# Patient Record
Sex: FEMALE | Race: OTHER | NOT HISPANIC OR LATINO | URBAN - METROPOLITAN AREA
[De-identification: names, ages, dates, MRNs, and addresses within clinical notes are randomized per-mention and may not be internally consistent; named-entity substitution may affect disease eponyms.]

---

## 2017-06-28 ENCOUNTER — OUTPATIENT (OUTPATIENT)
Dept: OUTPATIENT SERVICES | Facility: HOSPITAL | Age: 72
LOS: 1 days | End: 2017-06-28
Payer: COMMERCIAL

## 2017-06-28 DIAGNOSIS — Z22.321 CARRIER OR SUSPECTED CARRIER OF METHICILLIN SUSCEPTIBLE STAPHYLOCOCCUS AUREUS: ICD-10-CM

## 2017-06-28 PROCEDURE — 87641 MR-STAPH DNA AMP PROBE: CPT

## 2017-06-30 LAB
MRSA PCR RESULT.: NEGATIVE — SIGNIFICANT CHANGE UP
S AUREUS DNA NOSE QL NAA+PROBE: NEGATIVE — SIGNIFICANT CHANGE UP

## 2017-07-13 VITALS
DIASTOLIC BLOOD PRESSURE: 62 MMHG | SYSTOLIC BLOOD PRESSURE: 139 MMHG | RESPIRATION RATE: 16 BRPM | OXYGEN SATURATION: 96 % | WEIGHT: 144.18 LBS | HEART RATE: 66 BPM | HEIGHT: 65 IN | TEMPERATURE: 97 F

## 2017-07-13 RX ORDER — CELECOXIB 200 MG/1
400 CAPSULE ORAL ONCE
Qty: 0 | Refills: 0 | Status: COMPLETED | OUTPATIENT
Start: 2017-07-14 | End: 2017-07-14

## 2017-07-13 RX ORDER — OXYCODONE HYDROCHLORIDE 5 MG/1
20 TABLET ORAL ONCE
Qty: 0 | Refills: 0 | Status: DISCONTINUED | OUTPATIENT
Start: 2017-07-14 | End: 2017-07-14

## 2017-07-13 NOTE — H&P ADULT - PROBLEM SELECTOR PLAN 1
Admit for Left TKA. Medically cleared by Dr. Garnett Admit for Left TKA.   Medically cleared by Dr. Garnett

## 2017-07-13 NOTE — H&P ADULT - NSHPPHYSICALEXAM_GEN_ALL_CORE
MSK:    Right knee pain limited secondary to pain          Rest of PE per medical clearance note Gen: Pt seated in chair in NAD   Skin: Warm, color normal, no wounds, rashes.   Extremities: +Mild chronic varicosities to bilateral LE. No clubbing, no cyanosis, no edema.  Musculoskeletal: Decreased ROM secondary to pain, left knee. Sensation intact to light touch to bilateral LE distally. Motor Strength 5/5 to TA/GS/EHL/FHL bilaterally.  Vascular: DP/PT 2+, Brisk cap refill, Toes wwp     Remainder of exam as per medical clearance note

## 2017-07-13 NOTE — H&P ADULT - PSH
S/P biopsy  breast  Surgery, elective  Pressure release from eyes for glaucoma  Surgery, elective  ankle surgery, 1997

## 2017-07-13 NOTE — H&P ADULT - HISTORY OF PRESENT ILLNESS
71 y.o. F with left knee pain x    Presents for elective Left total knee replacement surgery 71 y.o. F with left knee pain x 10+ years that began spontaneously and without accident or trauma. Pt pain has progressively worsened without improvement and has become increasingly unresponsive to conservative management. Pt also c/o intermittent swelling requiring therapeutic arthrocentesis previously. Pt has also undergone Physical therapy and injections with temporary but non-lasting relief. Pt ambulates without assistance at home. Denies numbness/tingling of extremities, F/C/N/V, CP, SOB today.     Presents for elective Left total knee replacement surgery

## 2017-07-13 NOTE — H&P ADULT - PMH
Diverticulitis    Fall    GERD (gastroesophageal reflux disease)    Glaucoma    Graves' disease    OA (osteoarthritis)

## 2017-07-13 NOTE — PATIENT PROFILE ADULT. - PMH
Diverticulitis    GERD (gastroesophageal reflux disease)    Graves' disease    OA (osteoarthritis) Diverticulitis    Fall    GERD (gastroesophageal reflux disease)    Glaucoma    Graves' disease    OA (osteoarthritis)

## 2017-07-13 NOTE — H&P ADULT - FAMILY HISTORY
Father  Still living? Unknown  Family history of coronary artery disease, Age at diagnosis: Age Unknown  Family history of cerebrovascular accident (CVA), Age at diagnosis: Age Unknown  Family history of diabetes mellitus (DM), Age at diagnosis: Age Unknown     Mother  Still living? Unknown  Family history of coronary artery disease, Age at diagnosis: Age Unknown  Family history of diabetes mellitus (DM), Age at diagnosis: Age Unknown

## 2017-07-13 NOTE — PATIENT PROFILE ADULT. - PSH
Glaucoma    S/P biopsy  breast  Surgery, elective  ankle surgery, 1997 S/P biopsy  breast  Surgery, elective  Pressure release from eyes for glaucoma  Surgery, elective  ankle surgery, 1997

## 2017-07-14 ENCOUNTER — INPATIENT (INPATIENT)
Facility: HOSPITAL | Age: 72
LOS: 1 days | Discharge: HOME HEALTH | DRG: 470 | End: 2017-07-16
Attending: SPECIALIST | Admitting: SPECIALIST
Payer: COMMERCIAL

## 2017-07-14 DIAGNOSIS — Z41.9 ENCOUNTER FOR PROCEDURE FOR PURPOSES OTHER THAN REMEDYING HEALTH STATE, UNSPECIFIED: Chronic | ICD-10-CM

## 2017-07-14 DIAGNOSIS — M17.12 UNILATERAL PRIMARY OSTEOARTHRITIS, LEFT KNEE: ICD-10-CM

## 2017-07-14 DIAGNOSIS — Z98.890 OTHER SPECIFIED POSTPROCEDURAL STATES: Chronic | ICD-10-CM

## 2017-07-14 LAB
APPEARANCE UR: CLEAR — SIGNIFICANT CHANGE UP
BACTERIA # UR AUTO: PRESENT /HPF
BILIRUB UR-MCNC: NEGATIVE — SIGNIFICANT CHANGE UP
COLOR SPEC: YELLOW — SIGNIFICANT CHANGE UP
COMMENT - URINE: SIGNIFICANT CHANGE UP
DIFF PNL FLD: (no result)
EPI CELLS # UR: (no result) /HPF
GLUCOSE UR QL: NEGATIVE — SIGNIFICANT CHANGE UP
KETONES UR-MCNC: NEGATIVE — SIGNIFICANT CHANGE UP
LEUKOCYTE ESTERASE UR-ACNC: (no result)
NITRITE UR-MCNC: NEGATIVE — SIGNIFICANT CHANGE UP
PH UR: 5.5 — SIGNIFICANT CHANGE UP (ref 5–8)
PROT UR-MCNC: NEGATIVE MG/DL — SIGNIFICANT CHANGE UP
RBC CASTS # UR COMP ASSIST: < 5 /HPF — SIGNIFICANT CHANGE UP
SP GR SPEC: 1.01 — SIGNIFICANT CHANGE UP (ref 1–1.03)
UROBILINOGEN FLD QL: 0.2 E.U./DL — SIGNIFICANT CHANGE UP
WBC UR QL: (no result) /HPF

## 2017-07-14 PROCEDURE — 73560 X-RAY EXAM OF KNEE 1 OR 2: CPT | Mod: 26,LT

## 2017-07-14 RX ORDER — LACTOBACILLUS ACIDOPHILUS 100MM CELL
2.5 CAPSULE ORAL
Qty: 0 | Refills: 0 | COMMUNITY

## 2017-07-14 RX ORDER — OXYCODONE HYDROCHLORIDE 5 MG/1
5 TABLET ORAL EVERY 4 HOURS
Qty: 0 | Refills: 0 | Status: DISCONTINUED | OUTPATIENT
Start: 2017-07-14 | End: 2017-07-16

## 2017-07-14 RX ORDER — SODIUM CHLORIDE 9 MG/ML
1000 INJECTION INTRAMUSCULAR; INTRAVENOUS; SUBCUTANEOUS
Qty: 0 | Refills: 0 | Status: DISCONTINUED | OUTPATIENT
Start: 2017-07-14 | End: 2017-07-16

## 2017-07-14 RX ORDER — ASPIRIN/CALCIUM CARB/MAGNESIUM 324 MG
325 TABLET ORAL
Qty: 0 | Refills: 0 | Status: DISCONTINUED | OUTPATIENT
Start: 2017-07-14 | End: 2017-07-16

## 2017-07-14 RX ORDER — POLYETHYLENE GLYCOL 3350 17 G/17G
17 POWDER, FOR SOLUTION ORAL DAILY
Qty: 0 | Refills: 0 | Status: DISCONTINUED | OUTPATIENT
Start: 2017-07-14 | End: 2017-07-16

## 2017-07-14 RX ORDER — CELECOXIB 200 MG/1
200 CAPSULE ORAL DAILY
Qty: 0 | Refills: 0 | Status: DISCONTINUED | OUTPATIENT
Start: 2017-07-15 | End: 2017-07-16

## 2017-07-14 RX ORDER — MORPHINE SULFATE 50 MG/1
4 CAPSULE, EXTENDED RELEASE ORAL EVERY 4 HOURS
Qty: 0 | Refills: 0 | Status: DISCONTINUED | OUTPATIENT
Start: 2017-07-14 | End: 2017-07-16

## 2017-07-14 RX ORDER — PANTOPRAZOLE SODIUM 20 MG/1
40 TABLET, DELAYED RELEASE ORAL DAILY
Qty: 0 | Refills: 0 | Status: DISCONTINUED | OUTPATIENT
Start: 2017-07-14 | End: 2017-07-16

## 2017-07-14 RX ORDER — ACETAMINOPHEN 500 MG
650 TABLET ORAL EVERY 6 HOURS
Qty: 0 | Refills: 0 | Status: DISCONTINUED | OUTPATIENT
Start: 2017-07-14 | End: 2017-07-16

## 2017-07-14 RX ORDER — DOCUSATE SODIUM 100 MG
100 CAPSULE ORAL THREE TIMES A DAY
Qty: 0 | Refills: 0 | Status: DISCONTINUED | OUTPATIENT
Start: 2017-07-14 | End: 2017-07-16

## 2017-07-14 RX ORDER — KETOROLAC TROMETHAMINE 30 MG/ML
15 SYRINGE (ML) INJECTION ONCE
Qty: 0 | Refills: 0 | Status: DISCONTINUED | OUTPATIENT
Start: 2017-07-14 | End: 2017-07-14

## 2017-07-14 RX ORDER — BUPIVACAINE 13.3 MG/ML
20 INJECTION, SUSPENSION, LIPOSOMAL INFILTRATION ONCE
Qty: 0 | Refills: 0 | Status: DISCONTINUED | OUTPATIENT
Start: 2017-07-14 | End: 2017-07-16

## 2017-07-14 RX ORDER — MAGNESIUM HYDROXIDE 400 MG/1
30 TABLET, CHEWABLE ORAL DAILY
Qty: 0 | Refills: 0 | Status: DISCONTINUED | OUTPATIENT
Start: 2017-07-14 | End: 2017-07-16

## 2017-07-14 RX ORDER — SENNA PLUS 8.6 MG/1
2 TABLET ORAL AT BEDTIME
Qty: 0 | Refills: 0 | Status: DISCONTINUED | OUTPATIENT
Start: 2017-07-14 | End: 2017-07-16

## 2017-07-14 RX ORDER — ONDANSETRON 8 MG/1
4 TABLET, FILM COATED ORAL EVERY 6 HOURS
Qty: 0 | Refills: 0 | Status: DISCONTINUED | OUTPATIENT
Start: 2017-07-14 | End: 2017-07-16

## 2017-07-14 RX ORDER — MORPHINE SULFATE 50 MG/1
4 CAPSULE, EXTENDED RELEASE ORAL
Qty: 0 | Refills: 0 | Status: DISCONTINUED | OUTPATIENT
Start: 2017-07-14 | End: 2017-07-14

## 2017-07-14 RX ORDER — OXYCODONE HYDROCHLORIDE 5 MG/1
10 TABLET ORAL EVERY 12 HOURS
Qty: 0 | Refills: 0 | Status: DISCONTINUED | OUTPATIENT
Start: 2017-07-14 | End: 2017-07-16

## 2017-07-14 RX ORDER — CEFAZOLIN SODIUM 1 G
2000 VIAL (EA) INJECTION EVERY 8 HOURS
Qty: 0 | Refills: 0 | Status: COMPLETED | OUTPATIENT
Start: 2017-07-14 | End: 2017-07-15

## 2017-07-14 RX ADMIN — MORPHINE SULFATE 4 MILLIGRAM(S): 50 CAPSULE, EXTENDED RELEASE ORAL at 12:16

## 2017-07-14 RX ADMIN — OXYCODONE HYDROCHLORIDE 10 MILLIGRAM(S): 5 TABLET ORAL at 17:26

## 2017-07-14 RX ADMIN — OXYCODONE HYDROCHLORIDE 5 MILLIGRAM(S): 5 TABLET ORAL at 21:42

## 2017-07-14 RX ADMIN — Medication 15 MILLIGRAM(S): at 14:56

## 2017-07-14 RX ADMIN — OXYCODONE HYDROCHLORIDE 20 MILLIGRAM(S): 5 TABLET ORAL at 07:36

## 2017-07-14 RX ADMIN — Medication 100 MILLIGRAM(S): at 17:28

## 2017-07-14 RX ADMIN — MORPHINE SULFATE 4 MILLIGRAM(S): 50 CAPSULE, EXTENDED RELEASE ORAL at 12:00

## 2017-07-14 RX ADMIN — Medication 325 MILLIGRAM(S): at 17:25

## 2017-07-14 RX ADMIN — OXYCODONE HYDROCHLORIDE 5 MILLIGRAM(S): 5 TABLET ORAL at 22:05

## 2017-07-14 RX ADMIN — OXYCODONE HYDROCHLORIDE 10 MILLIGRAM(S): 5 TABLET ORAL at 18:20

## 2017-07-14 RX ADMIN — OXYCODONE HYDROCHLORIDE 5 MILLIGRAM(S): 5 TABLET ORAL at 12:38

## 2017-07-14 RX ADMIN — Medication 100 MILLIGRAM(S): at 21:42

## 2017-07-14 RX ADMIN — CELECOXIB 400 MILLIGRAM(S): 200 CAPSULE ORAL at 07:35

## 2017-07-14 RX ADMIN — MORPHINE SULFATE 4 MILLIGRAM(S): 50 CAPSULE, EXTENDED RELEASE ORAL at 12:36

## 2017-07-14 RX ADMIN — MORPHINE SULFATE 4 MILLIGRAM(S): 50 CAPSULE, EXTENDED RELEASE ORAL at 12:15

## 2017-07-14 RX ADMIN — MORPHINE SULFATE 4 MILLIGRAM(S): 50 CAPSULE, EXTENDED RELEASE ORAL at 12:35

## 2017-07-14 NOTE — PHYSICAL THERAPY INITIAL EVALUATION ADULT - ADDITIONAL COMMENTS
Patient lives with son in an apartment, elevator access. Patient reports stairs outside the building and in the lobby, however there are also ramps she can access. Per patient, son works night shift at his job, will be home during the day to assist patient as needed. Patient reports independence with all functional mobility without the use of an assistive device prior to hospitalization.

## 2017-07-14 NOTE — PHYSICAL THERAPY INITIAL EVALUATION ADULT - CRITERIA FOR SKILLED THERAPEUTIC INTERVENTIONS
risk reduction/prevention/impairments found/functional limitations in following categories/predicted duration of therapy intervention/anticipated discharge recommendation/rehab potential/therapy frequency/anticipated equipment needs at discharge

## 2017-07-14 NOTE — PHYSICAL THERAPY INITIAL EVALUATION ADULT - GAIT DEVIATIONS NOTED, PT EVAL
decreased velocity of limb motion/decreased step length/decreased weight-shifting ability/decreased nu

## 2017-07-14 NOTE — PROGRESS NOTE ADULT - SUBJECTIVE AND OBJECTIVE BOX
Orthopaedics Post Op Check    Procedure: Left total knee replacement   Surgeon: Dr. Fagan     Pt comfortable, without complaints  Denies CP, SOB, N/V, numbness/tingling     Vital Signs Last 24 Hrs  T(C): 35.9 (14 Jul 2017 14:34), Max: 36.7 (14 Jul 2017 11:05)  T(F): 96.7 (14 Jul 2017 14:34), Max: 98 (14 Jul 2017 11:05)  HR: 78 (14 Jul 2017 14:34) (52 - 78)  BP: 137/68 (14 Jul 2017 14:34) (105/62 - 137/68)  BP(mean): --  RR: 15 (14 Jul 2017 14:34) (10 - 20)  SpO2: 98% (14 Jul 2017 14:34) (98% - 99%)  AVSS, NAD    Dressing C/D/I  General: Pt Alert and oriented     Pulses: DP pulses palpable bilaterally  Sensation: SLT in tact and equal to distal bilateral lower extremities   Motor: EHL/FHL/TA/GS 5/5 motor strength bilateral lower extremities    Post op XR: left knee prosthesis in place    A/P: 71yFemale POD#0 s/p left TKR  - Stable  - Pain Control  - DVT ppx:  bid  - Post op abx: Ancef  - PT, WBS:  WBAT  - F/U AM Labs

## 2017-07-14 NOTE — PHYSICAL THERAPY INITIAL EVALUATION ADULT - GAIT DISTANCE, PT EVAL
2 side steps to right towards head of bed, 1 step backward. Ambulation limited secondary to complaints of increased dizziness as well as increased drowsiness throughout session

## 2017-07-14 NOTE — PHYSICAL THERAPY INITIAL EVALUATION ADULT - ASR EQUIP NEEDS DISCH PT EVAL
standard cane/Equipment to be ordered prior to time of discharge. Patient also inquiring about commode/rolling walker (5 inch wheels)

## 2017-07-14 NOTE — PHYSICAL THERAPY INITIAL EVALUATION ADULT - LIVES WITH, PROFILE
27 year old son. Per patient, son works night shift at his job and will be home during the day to assist her as needed/children

## 2017-07-14 NOTE — PHYSICAL THERAPY INITIAL EVALUATION ADULT - ACTIVE RANGE OF MOTION EXAMINATION, REHAB EVAL
Right LE Active ROM was WFL (within functional limits)/left knee flexion AROM 2-40/Left UE Active ROM was WFL (within functional limits)/Right UE Active ROM was WFL (within functional limits)

## 2017-07-15 LAB
ANION GAP SERPL CALC-SCNC: 10 MMOL/L — SIGNIFICANT CHANGE UP (ref 5–17)
BUN SERPL-MCNC: 9 MG/DL — SIGNIFICANT CHANGE UP (ref 7–23)
CALCIUM SERPL-MCNC: 8.6 MG/DL — SIGNIFICANT CHANGE UP (ref 8.4–10.5)
CHLORIDE SERPL-SCNC: 101 MMOL/L — SIGNIFICANT CHANGE UP (ref 96–108)
CO2 SERPL-SCNC: 26 MMOL/L — SIGNIFICANT CHANGE UP (ref 22–31)
CREAT SERPL-MCNC: 0.6 MG/DL — SIGNIFICANT CHANGE UP (ref 0.5–1.3)
CULTURE RESULTS: NO GROWTH — SIGNIFICANT CHANGE UP
GLUCOSE SERPL-MCNC: 118 MG/DL — HIGH (ref 70–99)
HCT VFR BLD CALC: 36.3 % — SIGNIFICANT CHANGE UP (ref 34.5–45)
HGB BLD-MCNC: 12.1 G/DL — SIGNIFICANT CHANGE UP (ref 11.5–15.5)
MCHC RBC-ENTMCNC: 29.2 PG — SIGNIFICANT CHANGE UP (ref 27–34)
MCHC RBC-ENTMCNC: 33.3 G/DL — SIGNIFICANT CHANGE UP (ref 32–36)
MCV RBC AUTO: 87.7 FL — SIGNIFICANT CHANGE UP (ref 80–100)
PLATELET # BLD AUTO: 183 K/UL — SIGNIFICANT CHANGE UP (ref 150–400)
POTASSIUM SERPL-MCNC: 4 MMOL/L — SIGNIFICANT CHANGE UP (ref 3.5–5.3)
POTASSIUM SERPL-SCNC: 4 MMOL/L — SIGNIFICANT CHANGE UP (ref 3.5–5.3)
RBC # BLD: 4.14 M/UL — SIGNIFICANT CHANGE UP (ref 3.8–5.2)
RBC # FLD: 13.4 % — SIGNIFICANT CHANGE UP (ref 10.3–16.9)
SODIUM SERPL-SCNC: 137 MMOL/L — SIGNIFICANT CHANGE UP (ref 135–145)
SPECIMEN SOURCE: SIGNIFICANT CHANGE UP
WBC # BLD: 9 K/UL — SIGNIFICANT CHANGE UP (ref 3.8–10.5)
WBC # FLD AUTO: 9 K/UL — SIGNIFICANT CHANGE UP (ref 3.8–10.5)

## 2017-07-15 RX ADMIN — OXYCODONE HYDROCHLORIDE 5 MILLIGRAM(S): 5 TABLET ORAL at 21:50

## 2017-07-15 RX ADMIN — Medication 100 MILLIGRAM(S): at 21:20

## 2017-07-15 RX ADMIN — OXYCODONE HYDROCHLORIDE 10 MILLIGRAM(S): 5 TABLET ORAL at 06:00

## 2017-07-15 RX ADMIN — POLYETHYLENE GLYCOL 3350 17 GRAM(S): 17 POWDER, FOR SOLUTION ORAL at 08:59

## 2017-07-15 RX ADMIN — OXYCODONE HYDROCHLORIDE 5 MILLIGRAM(S): 5 TABLET ORAL at 08:58

## 2017-07-15 RX ADMIN — Medication 650 MILLIGRAM(S): at 04:50

## 2017-07-15 RX ADMIN — OXYCODONE HYDROCHLORIDE 5 MILLIGRAM(S): 5 TABLET ORAL at 14:41

## 2017-07-15 RX ADMIN — Medication 325 MILLIGRAM(S): at 05:27

## 2017-07-15 RX ADMIN — OXYCODONE HYDROCHLORIDE 10 MILLIGRAM(S): 5 TABLET ORAL at 18:30

## 2017-07-15 RX ADMIN — OXYCODONE HYDROCHLORIDE 10 MILLIGRAM(S): 5 TABLET ORAL at 05:27

## 2017-07-15 RX ADMIN — PANTOPRAZOLE SODIUM 40 MILLIGRAM(S): 20 TABLET, DELAYED RELEASE ORAL at 08:58

## 2017-07-15 RX ADMIN — Medication 325 MILLIGRAM(S): at 17:35

## 2017-07-15 RX ADMIN — Medication 100 MILLIGRAM(S): at 00:52

## 2017-07-15 RX ADMIN — OXYCODONE HYDROCHLORIDE 5 MILLIGRAM(S): 5 TABLET ORAL at 13:51

## 2017-07-15 RX ADMIN — CELECOXIB 200 MILLIGRAM(S): 200 CAPSULE ORAL at 08:58

## 2017-07-15 RX ADMIN — Medication 100 MILLIGRAM(S): at 05:27

## 2017-07-15 RX ADMIN — OXYCODONE HYDROCHLORIDE 5 MILLIGRAM(S): 5 TABLET ORAL at 21:20

## 2017-07-15 RX ADMIN — OXYCODONE HYDROCHLORIDE 5 MILLIGRAM(S): 5 TABLET ORAL at 01:47

## 2017-07-15 RX ADMIN — OXYCODONE HYDROCHLORIDE 10 MILLIGRAM(S): 5 TABLET ORAL at 17:35

## 2017-07-15 RX ADMIN — OXYCODONE HYDROCHLORIDE 5 MILLIGRAM(S): 5 TABLET ORAL at 09:30

## 2017-07-15 RX ADMIN — Medication 100 MILLIGRAM(S): at 13:51

## 2017-07-15 NOTE — PROGRESS NOTE ADULT - SUBJECTIVE AND OBJECTIVE BOX
SUBJECTIVE: Patient seen and examined. Pt doing well o/n.  No f/c/n/v/cp/sob.     OBJECTIVE:  Vital Signs Last 24 Hrs  T(C): 36.2 (15 Jul 2017 05:05), Max: 36.7 (2017 11:05)  T(F): 97.2 (15 Jul 2017 05:05), Max: 98 (2017 11:05)  HR: 68 (15 Jul 2017 05:05) (52 - 78)  BP: 134/75 (15 Jul 2017 05:05) (105/62 - 137/68)  BP(mean): --  RR: 16 (15 Jul 2017 05:05) (10 - 20)  SpO2: 96% (15 Jul 2017 05:05) (96% - 99%)    Affected extremity:          Dressing: clean/dry/intact            Sensation: SILT         Motor exam: 5/5 TA/GS/EHL         warm well perfused; capillary refill <3 seconds     LABS:            Urinalysis Basic - ( 2017 07:25 )    Color: Yellow / Appearance: Clear / S.010 / pH: x  Gluc: x / Ketone: NEGATIVE  / Bili: NEGATIVE / Urobili: 0.2 E.U./dL   Blood: x / Protein: NEGATIVE mg/dL / Nitrite: NEGATIVE   Leuk Esterase: Large / RBC: < 5 /HPF / WBC Many /HPF   Sq Epi: x / Non Sq Epi: Moderate /HPF / Bacteria: Present /HPF      MEDICATIONS:BUpivacaine liposome 1.3% Injectable (no eMAR) 20 milliLiter(s) Local Injection once  sodium chloride 0.9%. 1000 milliLiter(s) IV Continuous <Continuous>  acetaminophen   Tablet 650 milliGRAM(s) Oral every 6 hours PRN  oxyCODONE    IR 5 milliGRAM(s) Oral every 4 hours PRN  morphine  - Injectable 4 milliGRAM(s) IV Push every 4 hours PRN  aluminum hydroxide/magnesium hydroxide/simethicone Suspension 30 milliLiter(s) Oral four times a day PRN  ondansetron Injectable 4 milliGRAM(s) IV Push every 6 hours PRN  pantoprazole    Tablet 40 milliGRAM(s) Oral daily  magnesium hydroxide Suspension 30 milliLiter(s) Oral daily PRN  polyethylene glycol 3350 17 Gram(s) Oral daily  senna 2 Tablet(s) Oral at bedtime PRN  docusate sodium 100 milliGRAM(s) Oral three times a day  oxyCODONE  ER Tablet 10 milliGRAM(s) Oral every 12 hours  celecoxib 200 milliGRAM(s) Oral daily  aspirin enteric coated 325 milliGRAM(s) Oral two times a day    Anticoagulation:  aspirin enteric coated 325 milliGRAM(s) Oral two times a day    Antibiotics:     Pain medications:   acetaminophen   Tablet 650 milliGRAM(s) Oral every 6 hours PRN  oxyCODONE    IR 5 milliGRAM(s) Oral every 4 hours PRN  morphine  - Injectable 4 milliGRAM(s) IV Push every 4 hours PRN  ondansetron Injectable 4 milliGRAM(s) IV Push every 6 hours PRN  oxyCODONE  ER Tablet 10 milliGRAM(s) Oral every 12 hours  celecoxib 200 milliGRAM(s) Oral daily    A/P :  Pt is a 70yo Female s/p L TKA 17  POD # 1  -    Pain control  -    DVT ppx: ASA     -    Weight bearing status: WBAT   -    Physical Therapy  -    Dispo: Home

## 2017-07-16 VITALS
RESPIRATION RATE: 16 BRPM | OXYGEN SATURATION: 95 % | TEMPERATURE: 99 F | SYSTOLIC BLOOD PRESSURE: 124 MMHG | HEART RATE: 78 BPM | DIASTOLIC BLOOD PRESSURE: 59 MMHG

## 2017-07-16 RX ORDER — ASPIRIN/CALCIUM CARB/MAGNESIUM 324 MG
1 TABLET ORAL
Qty: 60 | Refills: 0 | OUTPATIENT
Start: 2017-07-16

## 2017-07-16 RX ORDER — OXYCODONE HYDROCHLORIDE 5 MG/1
1 TABLET ORAL
Qty: 40 | Refills: 0 | OUTPATIENT
Start: 2017-07-16

## 2017-07-16 RX ADMIN — Medication 325 MILLIGRAM(S): at 05:20

## 2017-07-16 RX ADMIN — Medication 100 MILLIGRAM(S): at 05:20

## 2017-07-16 RX ADMIN — PANTOPRAZOLE SODIUM 40 MILLIGRAM(S): 20 TABLET, DELAYED RELEASE ORAL at 12:05

## 2017-07-16 RX ADMIN — CELECOXIB 200 MILLIGRAM(S): 200 CAPSULE ORAL at 12:04

## 2017-07-16 RX ADMIN — OXYCODONE HYDROCHLORIDE 10 MILLIGRAM(S): 5 TABLET ORAL at 05:50

## 2017-07-16 RX ADMIN — Medication 100 MILLIGRAM(S): at 12:04

## 2017-07-16 RX ADMIN — OXYCODONE HYDROCHLORIDE 10 MILLIGRAM(S): 5 TABLET ORAL at 05:20

## 2017-07-16 RX ADMIN — OXYCODONE HYDROCHLORIDE 5 MILLIGRAM(S): 5 TABLET ORAL at 10:32

## 2017-07-16 RX ADMIN — POLYETHYLENE GLYCOL 3350 17 GRAM(S): 17 POWDER, FOR SOLUTION ORAL at 12:04

## 2017-07-16 RX ADMIN — OXYCODONE HYDROCHLORIDE 5 MILLIGRAM(S): 5 TABLET ORAL at 11:32

## 2017-07-16 RX ADMIN — CELECOXIB 200 MILLIGRAM(S): 200 CAPSULE ORAL at 13:04

## 2017-07-16 NOTE — DISCHARGE NOTE ADULT - PLAN OF CARE
Improvement in knee function Patient was admitted for total joint replacement. Patient received gisel-op antibiotics. Patient tolerated the surgery well, and post operatively she was transferred to the surgical floor. Patient stayed on the surgical floor and received DVT prophylaxis. Patient tolerated the diet well. Pain was controlled with PO pain meds. Patient was able to get out of the bed and ambulate with support. He was discharged home on PO pain medication after clearance by PT. Plan to return 2 weeks after your joint replacement for follow-up. Appointments: by calling office number.  Place a dry dressing on the knee incision once or twice a day. The drainage from the knee should be decreasing. If it is not decreasing, let us know. Do not put any creams or ointments on the incision for 6 weeks after surgery.  A physical therapist should see you at least two to three times per week to move the knee and keep it limber. If for some reason the physical therapy is not being done, call us so that we can help you schedule it.  Do not sleep or rest with a pillow under your knee. Doing so would cause the knee to get stuck in a bent position. Instead, place the pillow under your ankle so that it stretches out the back of the knee and forces the knee to straighten.  You may shower starting 4 days after surgery. The incision can get wet but should be patted dry at the end of the shower. Do not soak the knee in a bathtub, hot tub, lake, or pool for 6 weeks after surgery. Showers only until that time.  you should not drive for 6 weeks after joint replacement never drive while on narcotic medications.  Try to take the narcotic medications as sparingly as possible: before going to bed and before physical therapy are two common times when patients feel they have more pain and should take the narcotics. Other pain relievers, such as ibuprofen and acetaminophen, are almost as effective without the severe side effects.  Most patients will take 325 mg of aspirin twice a day and wear compression stockings to prevent blood clots. Coated aspirin (Ecotrin or Bufferin) is easier to take than uncoated aspirin, especially when taken with food.  If you have chest pain or shortness of breath, call 911 to be seen by the EMS.  If you have any of the following, call our office to be seen in the clinic:  Increasing redness or increasing drainage around the incision  Increasing pain despite taking pain medication  Temperature of 101.5 degrees Fahrenheit or above

## 2017-07-16 NOTE — DISCHARGE NOTE ADULT - CARE PROVIDER_API CALL
Frank Fagan), Orthopaedic Surgery  [address] [place] [address]  Phone: [phone]  Fax: [phone]

## 2017-07-16 NOTE — DISCHARGE NOTE ADULT - CARE PLAN
Principal Discharge DX:	Primary osteoarthritis of left knee  Goal:	Improvement in knee function  Instructions for follow-up, activity and diet:	Patient was admitted for total joint replacement. Patient received gisel-op antibiotics. Patient tolerated the surgery well, and post operatively she was transferred to the surgical floor. Patient stayed on the surgical floor and received DVT prophylaxis. Patient tolerated the diet well. Pain was controlled with PO pain meds. Patient was able to get out of the bed and ambulate with support. He was discharged home on PO pain medication after clearance by PT. Principal Discharge DX:	Primary osteoarthritis of left knee  Goal:	Improvement in knee function  Instructions for follow-up, activity and diet:	Plan to return 2 weeks after your joint replacement for follow-up. Appointments: by calling office number.  Place a dry dressing on the knee incision once or twice a day. The drainage from the knee should be decreasing. If it is not decreasing, let us know. Do not put any creams or ointments on the incision for 6 weeks after surgery.  A physical therapist should see you at least two to three times per week to move the knee and keep it limber. If for some reason the physical therapy is not being done, call us so that we can help you schedule it.  Do not sleep or rest with a pillow under your knee. Doing so would cause the knee to get stuck in a bent position. Instead, place the pillow under your ankle so that it stretches out the back of the knee and forces the knee to straighten.  You may shower starting 4 days after surgery. The incision can get wet but should be patted dry at the end of the shower. Do not soak the knee in a bathtub, hot tub, lake, or pool for 6 weeks after surgery. Showers only until that time.  you should not drive for 6 weeks after joint replacement never drive while on narcotic medications.  Try to take the narcotic medications as sparingly as possible: before going to bed and before physical therapy are two common times when patients feel they have more pain and should take the narcotics. Other pain relievers, such as ibuprofen and acetaminophen, are almost as effective without the severe side effects.  Most patients will take 325 mg of aspirin twice a day and wear compression stockings to prevent blood clots. Coated aspirin (Ecotrin or Bufferin) is easier to take than uncoated aspirin, especially when taken with food.  If you have chest pain or shortness of breath, call 911 to be seen by the EMS.  If you have any of the following, call our office to be seen in the clinic:  Increasing redness or increasing drainage around the incision  Increasing pain despite taking pain medication  Temperature of 101.5 degrees Fahrenheit or above

## 2017-07-16 NOTE — DISCHARGE NOTE ADULT - MEDICATION SUMMARY - MEDICATIONS TO TAKE
I will START or STAY ON the medications listed below when I get home from the hospital:    magnesium  -- 250 milligram(s) by mouth once a day  -- Indication: For Home    aspirin 325 mg oral delayed release tablet  -- 1 tab(s) by mouth 2 times a day  -- Indication: For Anti Coag    oxyCODONE 5 mg oral tablet  -- 1 tab(s) by mouth every 4 hours, As Needed -for moderate pain MDD:12  -- Caution federal law prohibits the transfer of this drug to any person other  than the person for whom it was prescribed.  It is very important that you take or use this exactly as directed.  Do not skip doses or discontinue unless directed by your doctor.  May cause drowsiness.  Alcohol may intensify this effect.  Use care when operating dangerous machinery.  This prescription cannot be refilled.  Using more of this medication than prescribed may cause serious breathing problems.    -- Indication: For Pain    copper gluconate 2 mg oral tablet  -- 1 tab(s) by mouth once a day  -- Indication: For Home    lactobacillus acidophilus  -- 2.5  billion CFU by mouth   -- Indication: For Home    Calcium 600+D oral tablet  --  by mouth , 600mg + 400 IU  -- Indication: For Home

## 2017-07-16 NOTE — DISCHARGE NOTE ADULT - HOSPITAL COURSE
Patient was admitted for total joint replacement. Patient received gisel-op antibiotics. Patient tolerated the surgery well, and post operatively she was transferred to the surgical floor. Patient stayed on the surgical floor and received DVT prophylaxis. Patient tolerated the diet well. Pain was controlled with PO pain meds. Patient was able to get out of the bed and ambulate with support. He was discharged home on PO pain medication after clearance by PT.

## 2017-07-16 NOTE — DISCHARGE NOTE ADULT - INSTRUCTIONS
Patient was admitted for total joint replacement. Patient received gisel-op antibiotics. Patient tolerated the surgery well, and post operatively she was transferred to the surgical floor. Patient stayed on the surgical floor and received DVT prophylaxis. Patient tolerated the diet well. Pain was controlled with PO pain meds. Patient was able to get out of the bed and ambulate with support. He was discharged home on PO pain medication after clearance by PT. Plan to return 2 weeks after your joint replacement for follow-up. Appointments: by calling office number.  Place a dry dressing on the knee incision once or twice a day. The drainage from the knee should be decreasing. If it is not decreasing, let us know. Do not put any creams or ointments on the incision for 6 weeks after surgery.  A physical therapist should see you at least two to three times per week to move the knee and keep it limber. If for some reason the physical therapy is not being done, call us so that we can help you schedule it.  Do not sleep or rest with a pillow under your knee. Doing so would cause the knee to get stuck in a bent position. Instead, place the pillow under your ankle so that it stretches out the back of the knee and forces the knee to straighten.  You may shower starting 4 days after surgery. The incision can get wet but should be patted dry at the end of the shower. Do not soak the knee in a bathtub, hot tub, lake, or pool for 6 weeks after surgery. Showers only until that time.  you should not drive for 6 weeks after joint replacement never drive while on narcotic medications.  Try to take the narcotic medications as sparingly as possible: before going to bed and before physical therapy are two common times when patients feel they have more pain and should take the narcotics. Other pain relievers, such as ibuprofen and acetaminophen, are almost as effective without the severe side effects.  Most patients will take 325 mg of aspirin twice a day and wear compression stockings to prevent blood clots. Coated aspirin (Ecotrin or Bufferin) is easier to take than uncoated aspirin, especially when taken with food.  If you have chest pain or shortness of breath, call 911 to be seen by the EMS.  If you have any of the following, call our office to be seen in the clinic:  Increasing redness or increasing drainage around the incision  Increasing pain despite taking pain medication  Temperature of 101.5 degrees Fahrenheit or above

## 2017-07-18 DIAGNOSIS — K57.92 DIVERTICULITIS OF INTESTINE, PART UNSPECIFIED, WITHOUT PERFORATION OR ABSCESS WITHOUT BLEEDING: ICD-10-CM

## 2017-07-18 DIAGNOSIS — M17.12 UNILATERAL PRIMARY OSTEOARTHRITIS, LEFT KNEE: ICD-10-CM

## 2017-07-18 DIAGNOSIS — H40.9 UNSPECIFIED GLAUCOMA: ICD-10-CM

## 2017-07-18 DIAGNOSIS — K21.9 GASTRO-ESOPHAGEAL REFLUX DISEASE WITHOUT ESOPHAGITIS: ICD-10-CM

## 2017-07-18 DIAGNOSIS — E05.00 THYROTOXICOSIS WITH DIFFUSE GOITER WITHOUT THYROTOXIC CRISIS OR STORM: ICD-10-CM

## 2017-07-18 DIAGNOSIS — Z79.899 OTHER LONG TERM (CURRENT) DRUG THERAPY: ICD-10-CM

## 2017-07-18 PROCEDURE — 88300 SURGICAL PATH GROSS: CPT

## 2017-07-18 PROCEDURE — C1776: CPT

## 2017-07-18 PROCEDURE — 36415 COLL VENOUS BLD VENIPUNCTURE: CPT

## 2017-07-18 PROCEDURE — 80048 BASIC METABOLIC PNL TOTAL CA: CPT

## 2017-07-18 PROCEDURE — C1713: CPT

## 2017-07-18 PROCEDURE — 97161 PT EVAL LOW COMPLEX 20 MIN: CPT

## 2017-07-18 PROCEDURE — 85027 COMPLETE CBC AUTOMATED: CPT

## 2017-07-18 PROCEDURE — 81001 URINALYSIS AUTO W/SCOPE: CPT

## 2017-07-18 PROCEDURE — 87086 URINE CULTURE/COLONY COUNT: CPT

## 2017-07-18 PROCEDURE — 73560 X-RAY EXAM OF KNEE 1 OR 2: CPT

## 2017-07-18 PROCEDURE — 97116 GAIT TRAINING THERAPY: CPT

## 2017-07-19 LAB — SURGICAL PATHOLOGY STUDY: SIGNIFICANT CHANGE UP

## 2019-07-23 PROBLEM — Z00.00 ENCOUNTER FOR PREVENTIVE HEALTH EXAMINATION: Status: ACTIVE | Noted: 2019-07-23

## 2019-07-24 PROBLEM — M19.90 UNSPECIFIED OSTEOARTHRITIS, UNSPECIFIED SITE: Chronic | Status: ACTIVE | Noted: 2017-07-13

## 2019-07-24 PROBLEM — K21.9 GASTRO-ESOPHAGEAL REFLUX DISEASE WITHOUT ESOPHAGITIS: Chronic | Status: ACTIVE | Noted: 2017-07-13
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
## 2019-07-24 PROBLEM — K57.92 DIVERTICULITIS OF INTESTINE, PART UNSPECIFIED, WITHOUT PERFORATION OR ABSCESS WITHOUT BLEEDING: Chronic | Status: ACTIVE | Noted: 2017-07-13

## 2019-07-24 PROBLEM — H40.9 UNSPECIFIED GLAUCOMA: Chronic | Status: ACTIVE | Noted: 2017-07-13

## 2019-07-24 PROBLEM — E05.00 THYROTOXICOSIS WITH DIFFUSE GOITER WITHOUT THYROTOXIC CRISIS OR STORM: Chronic | Status: ACTIVE | Noted: 2017-07-13

## 2019-08-06 ENCOUNTER — APPOINTMENT (OUTPATIENT)
Dept: ORTHOPEDIC SURGERY | Facility: CLINIC | Age: 74
End: 2019-08-06
Payer: MEDICARE

## 2019-08-06 DIAGNOSIS — M75.50 BURSITIS OF UNSPECIFIED SHOULDER: ICD-10-CM

## 2019-08-06 DIAGNOSIS — M47.812 SPONDYLOSIS W/OUT MYELOPATHY OR RADICULOPATHY, CERVICAL REGION: ICD-10-CM

## 2019-08-06 PROCEDURE — 99204 OFFICE O/P NEW MOD 45 MIN: CPT

## 2019-08-06 PROCEDURE — 72040 X-RAY EXAM NECK SPINE 2-3 VW: CPT

## 2019-08-06 PROCEDURE — 73030 X-RAY EXAM OF SHOULDER: CPT | Mod: 50

## 2019-08-06 NOTE — HISTORY OF PRESENT ILLNESS
[___ mths] : [unfilled] month(s) ago [de-identified] : Patient reports dull pain consistently. Pain with changing clothes and sleeping on side, and reaching above head. RIGHT > LEFT.  States pain over the last 8 months. Denies any fall trauma. Denies any radicular pain.\par

## 2019-08-06 NOTE — PHYSICAL EXAM
[de-identified] : Bilateral Shoulder:\par Constitutional:\par The patient is healthy-appearing and in no apparent distress. \par \par Cardiovascular System: \par The capillary refill is less than 2 seconds. \par \par Skin: \par There are no skin abnormalities.\par \par C-Spine/Neck:\par \par Active Range of Motion:\par Flexion				30\par Extension			30\par Lateral rotation			50  \par \par Right Shoulder: \par Inspection: \par There is no atrophy, erythema, warmth, swelling.\par There is no scapular winging.\par There is no AC prominence. \par \par Bony Palpation: \par There is no tenderness of the clavicle.\par There is no tenderness of the acromioclavicular joint.\par There is no tenderness of the greater tuberosity. \par There is no tenderness of the bicipital groove.\par  \par Soft Tissue Palpation: \par There is no tenderness of the trapezius.\par There is no tenderess of the rhomboid.\par There is tenderness of the subacromial bursa. \par \par Active Range of Motion: \par Forward flexion- 				180 \par Abduction-					150\par External rotation at 0 degrees abduction-	80 \par Internal rotation at 0 degrees abduction-	80\par \par Passive Range of Motion: \par Forward flexion- 			180 \par Abduction-				150\par External rotation at 0 deg abduction-	80 \par Internal rotation at 0 deg abduction-	80\par \par Special Tests: \par Hawkin's  				Positive \par Neer's  				Positive \par Speed's  				Negative\par AC cross-over 			            Negative\par [place]'s  				Negative\par \par Stability: \par There is no general laxity. \par \par Left Shoulder: \par Inspection: \par There is no atrophy, erythema, warmth, swelling.\par There is no scapular winging.\par There is no AC prominence. \par \par Bony Palpation: \par There is no tenderness of the clavicle.\par There is no tenderness of the acromioclavicular joint.\par There is no tenderness of the greater tuberosity. \par There is no tenderness of the bicipital groove.\par  \par Soft Tissue Palpation: \par There is no tenderness of the trapezius.\par There is no tenderess of the rhomboid.\par There is tenderness of the subacromial bursa. \par \par Active Range of Motion: \par Forward flexion- 				180 \par Abduction-					150\par External rotation at 0 degrees abduction-	80 \par Internal rotation at 0 degrees abduction-	80\par \par Passive Range of Motion: \par Forward flexion- 			180 \par Abduction-				150\par External rotation at 0 deg abduction-	80 \par Internal rotation at 0 deg abduction-	80\par \par Special Tests: \par Hawkin's  				Positive \par Neer's  				Positive \par Speed's  				Negative\par AC cross-over 			            Negative\par [place]'s  				Negative\par \par Stability: \par There is no general laxity. \par \par Psychiatric: \par The patient demonstrates a normal mood and affect and is active and alert\par  [de-identified] : X-ray bilateral shoulder. There is no significant bony abnormality arthritis or fracture\par X-rays cervical spine. There is diffuse moderate to severe mid to lower cervical arthritis with loss of lordosis\par

## 2019-08-06 NOTE — ASSESSMENT
[FreeTextEntry1] : Patient given physical therapy prescription for shoulder and neck as well as home exercises for shoulder. Offered cortisone injection of the shoulders but patient declines. Patient follow up if no significant improvement with course of PT and home exercises

## 2019-08-07 ENCOUNTER — APPOINTMENT (OUTPATIENT)
Dept: ORTHOPEDIC SURGERY | Facility: CLINIC | Age: 74
End: 2019-08-07
Payer: MEDICARE

## 2019-08-07 VITALS — HEIGHT: 66 IN | BODY MASS INDEX: 23.3 KG/M2 | WEIGHT: 145 LBS

## 2019-08-07 PROCEDURE — 99214 OFFICE O/P EST MOD 30 MIN: CPT

## 2019-08-07 PROCEDURE — 73562 X-RAY EXAM OF KNEE 3: CPT | Mod: LT

## 2019-08-07 NOTE — DISCUSSION/SUMMARY
[de-identified] : Patient was told exactly what to do to increase her quadriceps strength. We will focus on the leg extension machine to gym and quadriceps strengthening she is given samples of Mobic to take it twice a day for the next 3 days and then one time each just before the next 6 visits of quadriceps strengthening at the gym

## 2019-08-07 NOTE — HISTORY OF PRESENT ILLNESS
[de-identified] : LEFT KNEE REPLACED 07/14/17\par NO PAIN, FALLS OR ACCIDENTS. HERE TO FOLLOW UP 2 YEARS POST OP. \par \par SENT FOR UPDATED XRAYSPatient has difficulty with going down stairs and getting out of a seated position occasionally sees some swelling but otherwise is satisfactory

## 2021-03-11 NOTE — PHYSICAL EXAM
[de-identified] : AP standing individual lateral and sunrise views are obtained showing a well-positioned [employer] turning the left side. Sunrise view indicates there may be some mild medial facet patellofemoral impingement [de-identified] : Left knee exam patient has range of motion of 0-130°. There is some mild terminal extension crepitus with pain on the medial aspect of the patella. Patent

## 2021-10-28 ENCOUNTER — TRANSCRIPTION ENCOUNTER (OUTPATIENT)
Age: 76
End: 2021-10-28

## 2022-06-13 ENCOUNTER — NON-APPOINTMENT (OUTPATIENT)
Age: 77
End: 2022-06-13

## 2022-07-12 ENCOUNTER — NON-APPOINTMENT (OUTPATIENT)
Age: 77
End: 2022-07-12

## 2022-07-13 ENCOUNTER — TRANSCRIPTION ENCOUNTER (OUTPATIENT)
Age: 77
End: 2022-07-13

## 2022-07-29 ENCOUNTER — NON-APPOINTMENT (OUTPATIENT)
Age: 77
End: 2022-07-29